# Patient Record
Sex: FEMALE | Race: BLACK OR AFRICAN AMERICAN | Employment: OTHER | URBAN - METROPOLITAN AREA
[De-identification: names, ages, dates, MRNs, and addresses within clinical notes are randomized per-mention and may not be internally consistent; named-entity substitution may affect disease eponyms.]

---

## 2017-04-18 ENCOUNTER — HOSPITAL ENCOUNTER (EMERGENCY)
Age: 67
Discharge: HOME | End: 2017-04-18
Attending: EMERGENCY MEDICINE
Payer: MEDICARE

## 2017-04-18 ENCOUNTER — APPOINTMENT (OUTPATIENT)
Dept: GENERAL RADIOLOGY | Age: 67
End: 2017-04-18
Attending: EMERGENCY MEDICINE
Payer: MEDICARE

## 2017-04-18 VITALS
BODY MASS INDEX: 24.8 KG/M2 | RESPIRATION RATE: 16 BRPM | HEART RATE: 82 BPM | HEIGHT: 63 IN | DIASTOLIC BLOOD PRESSURE: 75 MMHG | SYSTOLIC BLOOD PRESSURE: 144 MMHG | TEMPERATURE: 98.1 F | WEIGHT: 140 LBS | OXYGEN SATURATION: 99 %

## 2017-04-18 DIAGNOSIS — S39.012A BACK STRAIN, INITIAL ENCOUNTER: ICD-10-CM

## 2017-04-18 DIAGNOSIS — S50.11XA CONTUSION, ELBOW, WITH FOREARM, RIGHT, INITIAL ENCOUNTER: ICD-10-CM

## 2017-04-18 DIAGNOSIS — S80.01XA CONTUSION OF RIGHT KNEE, INITIAL ENCOUNTER: Primary | ICD-10-CM

## 2017-04-18 PROCEDURE — 73080 X-RAY EXAM OF ELBOW: CPT

## 2017-04-18 PROCEDURE — 74011250637 HC RX REV CODE- 250/637: Performed by: EMERGENCY MEDICINE

## 2017-04-18 PROCEDURE — 99283 EMERGENCY DEPT VISIT LOW MDM: CPT

## 2017-04-18 RX ORDER — ACETAMINOPHEN 325 MG/1
650 TABLET ORAL
Status: COMPLETED | OUTPATIENT
Start: 2017-04-18 | End: 2017-04-18

## 2017-04-18 RX ORDER — IBUPROFEN 600 MG/1
600 TABLET ORAL
Status: DISCONTINUED | OUTPATIENT
Start: 2017-04-18 | End: 2017-04-18 | Stop reason: HOSPADM

## 2017-04-18 RX ADMIN — ACETAMINOPHEN 650 MG: 325 TABLET ORAL at 14:56

## 2017-04-18 NOTE — ED TRIAGE NOTES
States that she tripped and fell in the kitchen x 30 minutes, c/o of right knee and right elbow pain with stiff lower back. Denies hitting head, no LOC, no blood thinner use.

## 2017-04-18 NOTE — ED NOTES
I have reviewed discharge instructions with the patient. The patient verbalized understanding.  Pt ambulated out of Ed in stable condition with no distress noted and no complaints voiced

## 2017-04-18 NOTE — ED NOTES
This nurse went to discharge pt, pt states she would like x-rays performed.  Dr Medrano Began made aware

## 2017-04-18 NOTE — DISCHARGE INSTRUCTIONS
Back Strain: Care Instructions  Your Care Instructions    Back strain happens when you overstretch, or pull, a muscle in your back. You may hurt your back in an accident or when you exercise or lift something. Most back pain will get better with rest and time. You can take care of yourself at home to help your back heal.  Follow-up care is a key part of your treatment and safety. Be sure to make and go to all appointments, and call your doctor if you are having problems. It's also a good idea to know your test results and keep a list of the medicines you take. How can you care for yourself at home? · Try to stay as active as you can, but stop or reduce any activity that causes pain. · Put ice or a cold pack on the sore muscle for 10 to 20 minutes at a time to stop swelling. Try this every 1 to 2 hours for 3 days (when you are awake) or until the swelling goes down. Put a thin cloth between the ice pack and your skin. · After 2 or 3 days, apply a heating pad on low or a warm cloth to your back. Some doctors suggest that you go back and forth between hot and cold treatments. · Take pain medicines exactly as directed. ¨ If the doctor gave you a prescription medicine for pain, take it as prescribed. ¨ If you are not taking a prescription pain medicine, ask your doctor if you can take an over-the-counter medicine. · Try sleeping on your side with a pillow between your legs. Or put a pillow under your knees when you lie on your back. These measures can ease pain in your lower back. · Return to your usual level of activity slowly. When should you call for help? Call 911 anytime you think you may need emergency care. For example, call if:  · You are unable to move a leg at all. Call your doctor now or seek immediate medical care if:  · You have new or worse symptoms in your legs, belly, or buttocks. Symptoms may include:  ¨ Numbness or tingling. ¨ Weakness. ¨ Pain.   · You lose bladder or bowel control. Watch closely for changes in your health, and be sure to contact your doctor if you are not getting better as expected. Where can you learn more? Go to http://kate-eliezer.info/. Enter A865 in the search box to learn more about \"Back Strain: Care Instructions. \"  Current as of: May 23, 2016  Content Version: 11.2  © 2823-3998 [employer]. Care instructions adapted under license by [employer] (which disclaims liability or warranty for this information). If you have questions about a medical condition or this instruction, always ask your healthcare professional. [place] [address] any warranty or liability for your use of this information. Bruises: Care Instructions  Your Care Instructions    Bruises occur when small blood vessels under the skin tear or rupture, most often from a twist, bump, or fall. Blood leaks into tissues under the skin and causes a black-and-blue spot that often turns colors, including purplish black, reddish blue, or yellowish green, as the bruise heals. Bruises hurt, but most are not serious and will go away on their own within 2 to 4 weeks. Sometimes, gravity causes them to spread down the body. A leg bruise usually will take longer to heal than a bruise on the face or arms. Follow-up care is a key part of your treatment and safety. Be sure to make and go to all appointments, and call your doctor if you are having problems. Its also a good idea to know your test results and keep a list of the medicines you take. How can you care for yourself at home? · Take pain medicines exactly as directed. ¨ If the doctor gave you a prescription medicine for pain, take it as prescribed. ¨ If you are not taking a prescription pain medicine, ask your doctor if you can take an over-the-counter medicine. · Put ice or a cold pack on the area for 10 to 20 minutes at a time. Put a thin cloth between the ice and your skin.   · If you can, prop up the bruised area on pillows as much as possible for the next few days. Try to keep the bruise above the level of your heart. When should you call for help? Call your doctor now or seek immediate medical care if:  · You have signs of infection, such as:  ¨ Increased pain, swelling, warmth, or redness. ¨ Red streaks leading from the bruise. ¨ Pus draining from the bruise. ¨ A fever. · You have a bruise on your leg and signs of a blood clot, such as:  ¨ Increasing redness and swelling along with warmth, tenderness, and pain in the bruised area. ¨ Pain in your calf, back of the knee, thigh, or groin. ¨ Redness and swelling in your leg or groin. · Your pain gets worse. Watch closely for changes in your health, and be sure to contact your doctor if:  · You do not get better as expected. Where can you learn more? Go to http://kate-eliezer.info/. Enter [phone] [phone] in the search box to learn more about \"Bruises: Care Instructions. \"  Current as of: May 27, 2016  Content Version: 11.2  © 6308-8031 [employer]. Care instructions adapted under license by [employer] (which disclaims liability or warranty for this information). If you have questions about a medical condition or this instruction, always ask your healthcare professional. [address] any warranty or liability for your use of this information.

## 2017-04-18 NOTE — ED PROVIDER NOTES
HPI Comments: 12:34 PM Felicia Orantes is a 77 y.o. female with a history of arthritis who presents to the emergency department complaining of right knee pain secondary to a fall that occurred in the hospital kitchen 30 minutes ago. Patient states she tripped over a cord and landed on all fours. Patient also c/o right elbow pain and mid back \"stiffness. \" Patient denies LOC or any other symptoms. There are no other concerns at this time. The history is provided by the patient. No past medical history on file. No past surgical history on file. No family history on file. Social History     Social History    Marital status:      Spouse name: N/A    Number of children: N/A    Years of education: N/A     Occupational History    Not on file. Social History Main Topics    Smoking status: Not on file    Smokeless tobacco: Not on file    Alcohol use Not on file    Drug use: Not on file    Sexual activity: Not on file     Other Topics Concern    Not on file     Social History Narrative         ALLERGIES: Review of patient's allergies indicates no known allergies. Review of Systems   Constitutional: Negative. Negative for appetite change and chills. HENT: Negative. Negative for congestion, sore throat and trouble swallowing. Eyes: Negative. Negative for visual disturbance. Respiratory: Negative. Negative for cough, shortness of breath and wheezing. Cardiovascular: Negative. Negative for chest pain, palpitations and leg swelling. Gastrointestinal: Negative. Negative for blood in stool and diarrhea. Genitourinary: Negative. Negative for difficulty urinating, dysuria, frequency and vaginal discharge. Musculoskeletal: Positive for arthralgias (right knee, right elbow) and back pain (lower back). Negative for myalgias. Skin: Negative. Negative for rash and wound. Neurological: Negative.   Negative for dizziness, syncope, speech difficulty, weakness and light-headedness. Psychiatric/Behavioral: Negative. Negative for hallucinations, self-injury and suicidal ideas. All other systems reviewed and are negative. Vitals:    04/18/17 1233   BP: 144/75   Pulse: 82   Resp: 16   Temp: 98.1 °F (36.7 °C)   SpO2: 99%   Weight: 63.5 kg (140 lb)   Height: 5' 3\" (1.6 m)            Physical Exam   Constitutional: She is oriented to person, place, and time. She appears well-developed and well-nourished. No distress. HENT:   Head: Normocephalic and atraumatic. Mouth/Throat: Oropharynx is clear and moist.   Eyes: Conjunctivae and EOM are normal. Pupils are equal, round, and reactive to light. No scleral icterus. Neck: Trachea normal and normal range of motion. Neck supple. No JVD present. No thyromegaly present. Cardiovascular: Normal rate, regular rhythm, S1 normal and S2 normal.    Pulmonary/Chest: Effort normal. No accessory muscle usage. No respiratory distress. Abdominal: Soft. Normal appearance. She exhibits no distension. There is no rigidity. Musculoskeletal: Normal range of motion. She exhibits no edema. Right knee with full flexion and extension; no instability and no effusion. Right elbow with full ROM to flexion and extension, pronation and supination; some tenderness over the olecranon bursa, but no effusion. No midline cervical, thoracic, or lumbar tenderness. Midline bilateral thoracic paraspinal tenderness. Neurological: She is alert and oriented to person, place, and time. She has normal strength. No cranial nerve deficit or sensory deficit. Coordination normal.   Skin: Skin is warm and intact. Bruising noted. No rash noted. Mild bruising over right anterior knee. Psychiatric: She has a normal mood and affect. Her speech is normal and behavior is normal.   Vitals reviewed.        MDM  Number of Diagnoses or Management Options  Back strain, initial encounter:   Contusion of right knee, initial encounter:   Contusion, elbow, with forearm, right, initial encounter:   Diagnosis management comments: Rodriguez Alonzo is a 77 y.o. Female coming in after a minor fall. No evidence of fracture or acute traumatic injury. Will advise on supportive treatment. ED Course       Procedures    Vitals:  Patient Vitals for the past 12 hrs:   Temp Pulse Resp BP SpO2   04/18/17 1233 98.1 °F (36.7 °C) 82 16 144/75 99 %   99% on RA, indicating adequate oxygenation. Progress notes, Consult notes or additional Procedure notes:     12:59 PM I have assessed the patient who will be discharged in stable condition. Patient is to return to emergency department if any new or worsening condition. Patient understands and verbalizes agreement with plan. Update: At discharge patient complains of more right elbow pain and requests Xray. No acute fracture seen. Will have follow up with PCP. Disposition:  Diagnosis:   1. Contusion of right knee, initial encounter    2. Contusion, elbow, with forearm, right, initial encounter    3. Back strain, initial encounter        Disposition: Discharged in stable condition. Follow-up Information     Follow up With Details Comments [address] [place] [address] Schedule an appointment as soon as possible for a visit As needed [address] [place] [facility] EMERGENCY DEPT Go to If symptoms worsen [address] [place] [id number]  [phone]           Patient's Medications    No medications on file     Scribe Attestation:    Mich Mckee, scribing for and in the presence of Joycelyn Thomas MD April 18, 2017 at 12:58 PM     Physician Attestation:   I personally performed the services described in this documentation, reviewed and edited the documentation which was dictated to the scribe in my presence, and it accurately records my words and actions.  Joycelyn Thomas MD  April 18, 2017 at 12:58 PM     Signed by: Pam Gomez, April 18, 2017,  12:58 PM

## 2021-07-22 ENCOUNTER — DOCUMENTATION ONLY (OUTPATIENT)
Dept: NEUROLOGY | Age: 71
End: 2021-07-22

## 2021-07-23 ENCOUNTER — OFFICE VISIT (OUTPATIENT)
Dept: NEUROLOGY | Age: 71
End: 2021-07-23
Payer: MEDICARE

## 2021-07-23 VITALS — HEIGHT: 64 IN | BODY MASS INDEX: 22.14 KG/M2

## 2021-07-23 DIAGNOSIS — R42 DIZZINESS: ICD-10-CM

## 2021-07-23 DIAGNOSIS — R90.82 WHITE MATTER DISEASE: Primary | ICD-10-CM

## 2021-07-23 PROCEDURE — 99204 OFFICE O/P NEW MOD 45 MIN: CPT | Performed by: STUDENT IN AN ORGANIZED HEALTH CARE EDUCATION/TRAINING PROGRAM

## 2021-07-23 PROCEDURE — 1090F PRES/ABSN URINE INCON ASSESS: CPT | Performed by: STUDENT IN AN ORGANIZED HEALTH CARE EDUCATION/TRAINING PROGRAM

## 2021-07-23 PROCEDURE — G8420 CALC BMI NORM PARAMETERS: HCPCS | Performed by: STUDENT IN AN ORGANIZED HEALTH CARE EDUCATION/TRAINING PROGRAM

## 2021-07-23 PROCEDURE — 1101F PT FALLS ASSESS-DOCD LE1/YR: CPT | Performed by: STUDENT IN AN ORGANIZED HEALTH CARE EDUCATION/TRAINING PROGRAM

## 2021-07-23 PROCEDURE — G8400 PT W/DXA NO RESULTS DOC: HCPCS | Performed by: STUDENT IN AN ORGANIZED HEALTH CARE EDUCATION/TRAINING PROGRAM

## 2021-07-23 PROCEDURE — G0463 HOSPITAL OUTPT CLINIC VISIT: HCPCS | Performed by: STUDENT IN AN ORGANIZED HEALTH CARE EDUCATION/TRAINING PROGRAM

## 2021-07-23 PROCEDURE — G8432 DEP SCR NOT DOC, RNG: HCPCS | Performed by: STUDENT IN AN ORGANIZED HEALTH CARE EDUCATION/TRAINING PROGRAM

## 2021-07-23 PROCEDURE — 3017F COLORECTAL CA SCREEN DOC REV: CPT | Performed by: STUDENT IN AN ORGANIZED HEALTH CARE EDUCATION/TRAINING PROGRAM

## 2021-07-23 PROCEDURE — G8427 DOCREV CUR MEDS BY ELIG CLIN: HCPCS | Performed by: STUDENT IN AN ORGANIZED HEALTH CARE EDUCATION/TRAINING PROGRAM

## 2021-07-23 PROCEDURE — G8536 NO DOC ELDER MAL SCRN: HCPCS | Performed by: STUDENT IN AN ORGANIZED HEALTH CARE EDUCATION/TRAINING PROGRAM

## 2021-07-23 RX ORDER — LANOLIN ALCOHOL/MO/W.PET/CERES
325 CREAM (GRAM) TOPICAL
COMMUNITY
Start: 2020-11-18 | End: 2021-11-18

## 2021-07-23 RX ORDER — CYCLOBENZAPRINE HCL 10 MG
10 TABLET ORAL
COMMUNITY
Start: 2020-09-16 | End: 2021-09-16

## 2021-07-23 RX ORDER — METOPROLOL SUCCINATE 25 MG/1
25 TABLET, EXTENDED RELEASE ORAL
COMMUNITY
Start: 2021-05-27 | End: 2022-05-27

## 2021-07-23 RX ORDER — CAPSAICIN 0.03 G/100G
CREAM TOPICAL
COMMUNITY
Start: 2021-05-14 | End: 2022-09-07

## 2021-07-23 RX ORDER — MECLIZINE HYDROCHLORIDE 25 MG/1
25 TABLET ORAL AS NEEDED
Qty: 20 TABLET | Refills: 3 | Status: SHIPPED | OUTPATIENT
Start: 2021-07-23 | End: 2021-08-12

## 2021-07-23 NOTE — PROGRESS NOTES
Sierra Perdue is a 79 y.o. female . presents for New Patient, Dizziness, and Multiple Sclerosis (r/o)   . A 79years old female patient with medical history of atrial fibrillation on Xarelto, arthritis, mixed connective tissue disease on immunosuppressive agents was referred here for evaluation of dizziness and MRI findings. She claims she has been having dizziness for the past 1 year. She feels lightheaded and a spinning sensation when sitting, when trying to get out of the bed, and when walking. She claims has not no relation with head movement. Fall lasted for few seconds. Not associated with nausea or vomiting. No weakness of her arms or legs. She has noticed reduced hearing on the left side but no associated earache, tinnitus or discharge. She feels off balance when she is walking. Sometimes she might go backwards try to avoid falling. Had 2 falls in the past the last one being in April. No associated passing out spells or episodes of loss of consciousness. She does not have any numbness. The dizziness is intermittent and has episodes 4-5 times per week. Patient does not have any tremor. No changes in her speech or swallowing. No diplopia. She had an MRI of the brain recently which showed nonspecific white matter changes with some microhemorrhages; no acute stroke or mass lesion. Review of Systems   Constitutional: Positive for weight loss. Negative for chills and fever. HENT: Positive for hearing loss and tinnitus. Eyes: Negative for blurred vision and double vision. Respiratory: Positive for shortness of breath (when walking). Negative for cough. Cardiovascular: Positive for chest pain. Negative for leg swelling. Gastrointestinal: Positive for nausea and vomiting. Genitourinary: Positive for dysuria (sometimes) and frequency. Negative for urgency. Musculoskeletal: Positive for back pain. Negative for neck pain. Skin: Positive for itching and rash. Neurological: Positive for dizziness and headaches. Negative for tingling, tremors, sensory change, speech change and focal weakness. Psychiatric/Behavioral: Negative for depression. The patient is not nervous/anxious.         Past Medical History:   Diagnosis Date    Arrhythmia     IRREGULAR HEART BEAT    Arthritis     Atrial fibrillation (HCC)     Diabetes (HCC)     Fibroids, submucosal     GERD (gastroesophageal reflux disease)     Hypertension     Lupus (systemic lupus erythematosus) (HCC)     AFFECTED LIVER    Regurgitation     FOOD COMES BACK UP ESOPHAGUS    Rheumatoid arthritis ([address]        Past Surgical History:   Procedure Laterality Date    HX  SECTION      X2    HX ENDOSCOPY      HX TOTAL VAGINAL HYSTERECTOMY Left     LEFT SALPINGO-OOPHORECTOMY        Family History   Problem Relation Age of Onset    Diabetes Mother     Hypertension Mother     Hypertension Father     Hypertension Sister    [facility] Lupus Sister     Dementia Brother     Diabetes Maternal Aunt     Diabetes Maternal Uncle     Diabetes Maternal Grandmother     Stroke Maternal Grandmother     Diabetes Maternal Grandfather     Hypertension Brother     Stroke Brother     Asthma Brother     Hypertension Brother     Hypertension Brother     Stroke Brother     Diabetes Brother     Diabetes Brother         Social History     Socioeconomic History    Marital status:      Spouse name: Not on file    Number of children: Not on file    Years of education: Not on file    Highest education level: Not on file   Occupational History    Not on file   Tobacco Use    Smoking status: Never Smoker    Smokeless tobacco: Never Used   Substance and Sexual Activity    Alcohol use: No    Drug use: No    Sexual activity: Yes     Partners: Male   Other Topics Concern    Not on file   Social History Narrative    Not on file     Social Determinants of Health     Financial Resource Strain:     Difficulty of Paying Living Expenses:    Food Insecurity:     Worried About [facility] in the Last Year:     [address] [facility] in the Last Year:    Transportation Needs:     Lack of Transportation (Medical):  Lack of Transportation (Non-Medical):    Physical Activity:     Days of Exercise per Week:     Minutes of Exercise per Session:    Stress:     Feeling of Stress :    Social Connections:     Frequency of Communication with Friends and Family:     Frequency of Social Gatherings with Friends and Family:     Attends [employer] Services:     Active Member of Clubs or Organizations:     Attends Club or Organization Meetings:     Marital Status:    Intimate Partner Violence:     Fear of Current or Ex-Partner:     Emotionally Abused:     Physically Abused:     Sexually Abused: Allergies   Allergen Reactions    Celebrex [Celecoxib] Hives         Current Outpatient Medications   Medication Sig Dispense Refill    metoprolol succinate (TOPROL-XL) 25 mg XL tablet Take 25 mg by mouth.  capsicum oleoresin 0.025 % topical cream       cyclobenzaprine (FLEXERIL) 10 mg tablet Take 10 mg by mouth.  ferrous sulfate (FerrouSuL) 325 mg (65 mg iron) tablet Take 325 mg by mouth every Monday, Wednesday, Friday.  diclofenac (VOLTAREN) 1 % gel Apply  to affected area four (4) times daily. Indications: joint damage causing pain and loss of function      hydroxychloroquine (PLAQUENIL) 200 mg tablet Take 200 mg by mouth daily. Indications: systemic lupus erythematosus, an autoimmune disease      omeprazole (PRILOSEC) 40 mg capsule Take 40 mg by mouth two (2) times a day. Indications: gastroesophageal reflux disease      NIFEdipine ER (ADALAT CC) 30 mg ER tablet Take 30 mg by mouth daily. Indications: high blood pressure      leflunomide (ARAVA) 10 mg tablet Take 10 mg by mouth daily. Indications: rheumatoid arthritis      rivaroxaban (XARELTO) 20 mg tab tablet Take 20 mg by mouth daily.  Indications: treatment to prevent blood clots in chronic atrial fibrillation      metFORMIN (GLUCOPHAGE) 1,000 mg tablet Take 1,000 mg by mouth daily. (Patient not taking: Reported on 7/23/2021)      flecainide (TAMBOCOR) 100 mg tablet Take 100 mg by mouth two (2) times a day. Indications: PAROXYSMAL ATRIAL FIBRILLATION, PREVENTION OF RECURRENT ATRIAL FIBRILLATION (Patient not taking: Reported on 7/23/2021)           Physical Exam  Constitutional:       Appearance: Normal appearance. HENT:      Head: Normocephalic and atraumatic. Mouth/Throat:      Mouth: Mucous membranes are moist.      Pharynx: Oropharynx is clear. No oropharyngeal exudate. Eyes:      Extraocular Movements: Extraocular movements intact. Pupils: Pupils are equal, round, and reactive to light. Pulmonary:      Effort: Pulmonary effort is normal. No respiratory distress. Musculoskeletal:         General: No tenderness. Cervical back: Normal range of motion and neck supple. Right lower leg: No edema. Left lower leg: No edema. Comments: Thickening of the fingers on both sides     Neurological:      Mental Status: She is alert. Comments: Mental status: Awake, alert, oriented x3, follows simple and complex commands, no neglect, no extinction to DSS or VSS  Speech and languge: fluent, coherent, and comprehension intact  CN: VFF, EOMI with no nystagmus, PERRLA, face sensation intact , no facial asymmetry noted, palate elevation symmetric bilat, SS+SCM 5/5 bilat, tongue midline. Negative head thrust test  Motor: no pronator drift, tone normal throughout, strength 5/5 throughout  Sensory: intact to light touch and PP  throughout  Coordination: FNF, HS accurate w/o dysmetria  DTR: 2+ throughou  Gait: normal.             No visits with results within 3 Month(s) from this visit.    Latest known visit with results is:   Hospital Outpatient Visit on 03/14/2011   Component Date Value Ref Range Status    Glucose (POC) 03/14/2011 134* 70 - 110 mg/dL Final             ICD-10-CM ICD-9-CM    1. White matter disease  R90.82 348.89 STRATIFY JCV AB WITH INDEX W/RELEX INHIBITION ASSAY   2. Dizziness  R42 780.4 meclizine (ANTIVERT) 25 mg tablet     A 79years old female patient with upper medical problems including mixed connective tissue disease currently taking leflunomide and hydroxychloroquine with referred here for evaluation of dizziness and intermittent balance difficulty of about a year duration. No focal neuro deficits on exam.  Patient has decreased hearing on the left side. Symptoms are intermittent and lasting for a brief. .  No association with head movement. MRI of the brain showed nonspecific white matter changes with microhemorrhages which could be from longstanding hypertension. But in this patient, on immunosuppressive agents, will check CEASAR virus is a serum. If the CEASAR virus serum antibodies. If the test is positive, might consider lumbar puncture for CSF studies. Unlikely to be from the evaluating process like multiple sclerosis in her age group. Possibilities of BPPV versus Ménière's disease [reduced hearing on the left side]. We will give her meclizine as needed for symptomatic treatment. We will see her in the clinic in 3 months time.

## 2021-10-12 ENCOUNTER — OFFICE VISIT (OUTPATIENT)
Dept: NEUROLOGY | Age: 71
End: 2021-10-12
Payer: MEDICARE

## 2021-10-12 VITALS
HEIGHT: 63 IN | BODY MASS INDEX: 19.81 KG/M2 | RESPIRATION RATE: 20 BRPM | OXYGEN SATURATION: 99 % | SYSTOLIC BLOOD PRESSURE: 132 MMHG | DIASTOLIC BLOOD PRESSURE: 80 MMHG | WEIGHT: 111.8 LBS | HEART RATE: 67 BPM

## 2021-10-12 DIAGNOSIS — R42 DIZZINESS: Primary | ICD-10-CM

## 2021-10-12 PROCEDURE — 3017F COLORECTAL CA SCREEN DOC REV: CPT | Performed by: STUDENT IN AN ORGANIZED HEALTH CARE EDUCATION/TRAINING PROGRAM

## 2021-10-12 PROCEDURE — G0463 HOSPITAL OUTPT CLINIC VISIT: HCPCS | Performed by: STUDENT IN AN ORGANIZED HEALTH CARE EDUCATION/TRAINING PROGRAM

## 2021-10-12 PROCEDURE — 1090F PRES/ABSN URINE INCON ASSESS: CPT | Performed by: STUDENT IN AN ORGANIZED HEALTH CARE EDUCATION/TRAINING PROGRAM

## 2021-10-12 PROCEDURE — G8536 NO DOC ELDER MAL SCRN: HCPCS | Performed by: STUDENT IN AN ORGANIZED HEALTH CARE EDUCATION/TRAINING PROGRAM

## 2021-10-12 PROCEDURE — G8400 PT W/DXA NO RESULTS DOC: HCPCS | Performed by: STUDENT IN AN ORGANIZED HEALTH CARE EDUCATION/TRAINING PROGRAM

## 2021-10-12 PROCEDURE — 99213 OFFICE O/P EST LOW 20 MIN: CPT | Performed by: STUDENT IN AN ORGANIZED HEALTH CARE EDUCATION/TRAINING PROGRAM

## 2021-10-12 PROCEDURE — 1101F PT FALLS ASSESS-DOCD LE1/YR: CPT | Performed by: STUDENT IN AN ORGANIZED HEALTH CARE EDUCATION/TRAINING PROGRAM

## 2021-10-12 PROCEDURE — G8432 DEP SCR NOT DOC, RNG: HCPCS | Performed by: STUDENT IN AN ORGANIZED HEALTH CARE EDUCATION/TRAINING PROGRAM

## 2021-10-12 PROCEDURE — G8420 CALC BMI NORM PARAMETERS: HCPCS | Performed by: STUDENT IN AN ORGANIZED HEALTH CARE EDUCATION/TRAINING PROGRAM

## 2021-10-12 PROCEDURE — G8427 DOCREV CUR MEDS BY ELIG CLIN: HCPCS | Performed by: STUDENT IN AN ORGANIZED HEALTH CARE EDUCATION/TRAINING PROGRAM

## 2021-10-12 RX ORDER — MECLIZINE HYDROCHLORIDE 25 MG/1
25 TABLET ORAL
Qty: 20 TABLET | Refills: 2 | Status: SHIPPED | OUTPATIENT
Start: 2021-10-12 | End: 2021-11-01

## 2021-10-12 NOTE — PROGRESS NOTES
Keyanna Monroe is a 70 y.o. female . presents for Follow-up (white matter disease)   . A 70years old female patient here for follow-up of dizziness. Last seen in the clinic in June 2021. She still complains of the intermittent spinning sensation. Is mostly when she is trying to get up; but, when moving her head from side to side, do not have any significant spinning. Not associated with nausea or vomiting. When walking, she feels off balance. No recent falls. No weakness of her arms or legs. Currently has only mild headache. Because of her MRI changes, CEASAR virus test was ordered but not done. Not currently taking meclizine. From previous encounter:  A 79years old female patient with medical history of atrial fibrillation on Xarelto, arthritis, mixed connective tissue disease on immunosuppressive agents was referred here for evaluation of dizziness and MRI findings. She claims she has been having dizziness for the past 1 year. She feels lightheaded and a spinning sensation when sitting, when trying to get out of the bed, and when walking. She claims has not no relation with head movement. Fall lasted for few seconds. Not associated with nausea or vomiting. No weakness of her arms or legs. She has noticed reduced hearing on the left side but no associated earache, tinnitus or discharge. She feels off. When she is walking. Sometimes she might go backwards try to avoid falling. Had 2 falls in the past the last one being in April. No associated passing out spells or episodes of loss of consciousness. She does not have any numbness. The dizziness is intermittent and has episodes 4-5 times per week. Patient does not have any tremor. No changes in her speech or swallowing. No diplopia. She had an MRI of the brain recently which showed nonspecific white matter changes with some microhemorrhages; no acute stroke or mass lesion.             Review of Systems   Constitutional: Negative for chills, fever and weight loss. HENT: Positive for hearing loss and tinnitus. Eyes: Negative for blurred vision and double vision. Respiratory: Positive for shortness of breath (when walking). Negative for cough. Cardiovascular: Negative for chest pain and leg swelling. Gastrointestinal: Negative for nausea and vomiting. Genitourinary: Negative for dysuria, frequency and urgency. Musculoskeletal: Negative for back pain and neck pain. Skin: Negative for itching and rash. Neurological: Positive for dizziness and headaches. Negative for tingling, tremors, sensory change, speech change and focal weakness.        Past Medical History:   Diagnosis Date    Arrhythmia     IRREGULAR HEART BEAT    Arthritis     Atrial fibrillation ([facility]     Diabetes ([facility]     Fibroids, submucosal     GERD (gastroesophageal reflux disease)     Hypertension     Lupus (systemic lupus erythematosus) (HCC)     AFFECTED LIVER    Regurgitation     FOOD COMES BACK UP ESOPHAGUS    Rheumatoid arthritis ([facility] [address]        Past Surgical History:   Procedure Laterality Date    HX  SECTION      X2    HX ENDOSCOPY      HX TOTAL VAGINAL HYSTERECTOMY Left     LEFT SALPINGO-OOPHORECTOMY        Family History   Problem Relation Age of Onset    Diabetes Mother     Hypertension Mother     Hypertension Father     Hypertension Sister    Jannell Outhouse Lupus Sister     Dementia Brother     Diabetes Maternal Aunt     Diabetes Maternal Uncle     Diabetes Maternal Grandmother     Stroke Maternal Grandmother     Diabetes Maternal Grandfather     Hypertension Brother     Stroke Brother     Asthma Brother     Hypertension Brother     Hypertension Brother     Stroke Brother     Diabetes Brother     Diabetes Brother         Social History     Socioeconomic History    Marital status:      Spouse name: Not on file    Number of children: Not on file    Years of education: Not on file    Highest education level: Not on file Occupational History    Not on file   Tobacco Use    Smoking status: Never Smoker    Smokeless tobacco: Never Used   Substance and Sexual Activity    Alcohol use: No    Drug use: No    Sexual activity: Yes     Partners: Male   Other Topics Concern    Not on file   Social History Narrative    Not on file     Social Determinants of Health     Financial Resource Strain:     Difficulty of Paying Living Expenses:    Food Insecurity:     Worried About Running Out of Food in the Last Year:     [address] in the Last Year:    Transportation Needs:     Lack of Transportation (Medical):  Lack of Transportation (Non-Medical):    Physical Activity:     Days of Exercise per Week:     Minutes of Exercise per Session:    Stress:     Feeling of Stress :    Social Connections:     Frequency of Communication with Friends and Family:     Frequency of Social Gatherings with Friends and Family:     Attends Jewish Services:     Active Member of Clubs or Organizations:     Attends Club or Organization Meetings:     Marital Status:    Intimate Partner Violence:     Fear of Current or Ex-Partner:     Emotionally Abused:     Physically Abused:     Sexually Abused: Allergies   Allergen Reactions    Celecoxib Hives     Reaction(s): Unknown; Note: [id number] [facility] [phone] HIVES. takes vioxx at home           Current Outpatient Medications   Medication Sig Dispense Refill    meclizine (ANTIVERT) 25 mg tablet Take 1 Tablet by mouth every twelve (12) hours as needed for Dizziness for up to 20 days. 20 Tablet 2    metoprolol succinate (TOPROL-XL) 25 mg XL tablet Take 25 mg by mouth.  capsicum oleoresin 0.025 % topical cream       ferrous sulfate (FerrouSuL) 325 mg (65 mg iron) tablet Take 325 mg by mouth every Monday, Wednesday, Friday.  diclofenac (VOLTAREN) 1 % gel Apply  to affected area four (4) times daily.  Indications: joint damage causing pain and loss of function      hydroxychloroquine (PLAQUENIL) 200 mg tablet Take 200 mg by mouth daily. Indications: systemic lupus erythematosus, an autoimmune disease      flecainide (TAMBOCOR) 100 mg tablet Take 100 mg by mouth two (2) times a day. Indications: paroxysmal atrial fibrillation, prevention of recurrent atrial fibrillation      omeprazole (PRILOSEC) 40 mg capsule Take 40 mg by mouth two (2) times a day. Indications: gastroesophageal reflux disease      NIFEdipine ER (ADALAT CC) 30 mg ER tablet Take 30 mg by mouth daily. Indications: high blood pressure      leflunomide (ARAVA) 10 mg tablet Take 10 mg by mouth daily. Indications: rheumatoid arthritis      rivaroxaban (XARELTO) 20 mg tab tablet Take 20 mg by mouth daily. Indications: treatment to prevent blood clots in chronic atrial fibrillation           Physical Exam  Constitutional:       Appearance: Normal appearance. HENT:      Head: Normocephalic and atraumatic. Mouth/Throat:      Mouth: Mucous membranes are moist.      Pharynx: Oropharynx is clear. No oropharyngeal exudate. Eyes:      Extraocular Movements: Extraocular movements intact. Pupils: Pupils are equal, round, and reactive to light. Pulmonary:      Effort: Pulmonary effort is normal. No respiratory distress. Musculoskeletal:         General: No tenderness. Cervical back: Normal range of motion and neck supple. Right lower leg: No edema. Left lower leg: No edema. Comments: Thickening of the fingers on both sides     Neurological:      Mental Status: She is alert. Comments: Mental status: Awake, alert, oriented x3, follows simple and complex commands, no neglect, no extinction to DSS or VSS  Speech and languge: fluent, coherent, and comprehension intact  CN: VFF, EOMI with no nystagmus, PERRLA, face sensation intact , no facial asymmetry noted, palate elevation symmetric bilat, SS+SCM 5/5 bilat, tongue midline.     Motor: no pronator drift, tone normal throughout, strength 5/5 throughout  Sensory: intact to light touch and PP  throughout  Coordination: FNF, HS accurate w/o dysmetria  DTR: 2+ throughou  Gait: normal.             No visits with results within 3 Month(s) from this visit. Latest known visit with results is:   Hospital Outpatient Visit on 03/14/2011   Component Date Value Ref Range Status    Glucose (POC) 03/14/2011 134* 70 - 110 mg/dL Final             ICD-10-CM ICD-9-CM    1. Dizziness  R42 780.4 meclizine (ANTIVERT) 25 mg tablet     A 70years old female patient with upper medical problems including mixed connective tissue disease currently taking leflunomide and hydroxychloroquine for follow-up of dizziness. Continues to have intermittent spinning sensation especially when she is trying to get up. Not associated nausea or vomiting. Feels a little off balance but no falls. We will continue with as needed meclizine. No significant orthostatic changes in her blood pressure. Discussed the need to control her blood pressure. Since she is on leflunomide, will get CEASAR virus antibodies. I have advised her to go to the emergency room if any strokelike symptoms. We will see her again as needed in 6 months time.

## 2022-11-07 ENCOUNTER — HOSPITAL ENCOUNTER (OUTPATIENT)
Dept: LAB | Age: 72
Discharge: HOME | End: 2022-11-07
Payer: MEDICARE

## 2022-11-07 DIAGNOSIS — C67.9 MALIGNANT NEOPLASM OF URINARY BLADDER, UNSPECIFIED SITE (HCC): ICD-10-CM

## 2022-11-07 DIAGNOSIS — C80.1 ADENOCARCINOMA (HCC): ICD-10-CM

## 2022-11-07 DIAGNOSIS — C68.0 URETHRA CANCER (HCC): ICD-10-CM

## 2022-11-07 DIAGNOSIS — C68.0 MALIGNANT NEOPLASM OF URETHRA (HCC): ICD-10-CM

## 2022-11-07 DIAGNOSIS — R79.89 OTHER SPECIFIED ABNORMAL FINDINGS OF BLOOD CHEMISTRY: ICD-10-CM

## 2022-11-07 LAB
ALBUMIN SERPL-MCNC: 3.4 G/DL (ref 3.4–5)
ALBUMIN/GLOB SERPL: 0.8 {RATIO} (ref 0.8–1.7)
ALP SERPL-CCNC: 102 U/L (ref 45–117)
ALT SERPL-CCNC: 21 U/L (ref 13–56)
ANION GAP SERPL CALC-SCNC: 4 MMOL/L (ref 3–18)
AST SERPL-CCNC: 23 U/L (ref 10–38)
BASOPHILS # BLD: 0 K/UL (ref 0–0.1)
BASOPHILS NFR BLD: 1 % (ref 0–2)
BILIRUB SERPL-MCNC: 0.3 MG/DL (ref 0.2–1)
BUN SERPL-MCNC: 14 MG/DL (ref 7–18)
BUN/CREAT SERPL: 21 (ref 12–20)
CALCIUM SERPL-MCNC: 9.1 MG/DL (ref 8.5–10.1)
CEA SERPL-MCNC: 31.7 NG/ML
CHLORIDE SERPL-SCNC: 110 MMOL/L (ref 100–111)
CO2 SERPL-SCNC: 28 MMOL/L (ref 21–32)
CREAT SERPL-MCNC: 0.68 MG/DL (ref 0.6–1.3)
DIFFERENTIAL METHOD BLD: ABNORMAL
EOSINOPHIL # BLD: 0 K/UL (ref 0–0.4)
EOSINOPHIL NFR BLD: 1 % (ref 0–5)
ERYTHROCYTE [DISTWIDTH] IN BLOOD BY AUTOMATED COUNT: 19.1 % (ref 11.6–14.5)
GLOBULIN SER CALC-MCNC: 4.5 G/DL (ref 2–4)
GLUCOSE SERPL-MCNC: 108 MG/DL (ref 74–99)
HCT VFR BLD AUTO: 35.9 % (ref 35–45)
HGB BLD-MCNC: 10.7 G/DL (ref 12–16)
IMM GRANULOCYTES # BLD AUTO: 0 K/UL (ref 0–0.04)
IMM GRANULOCYTES NFR BLD AUTO: 0 % (ref 0–0.5)
LYMPHOCYTES # BLD: 1.3 K/UL (ref 0.9–3.6)
LYMPHOCYTES NFR BLD: 37 % (ref 21–52)
MCH RBC QN AUTO: 19 PG (ref 24–34)
MCHC RBC AUTO-ENTMCNC: 29.8 G/DL (ref 31–37)
MCV RBC AUTO: 63.9 FL (ref 78–100)
MONOCYTES # BLD: 0.3 K/UL (ref 0.05–1.2)
MONOCYTES NFR BLD: 9 % (ref 3–10)
NEUTS SEG # BLD: 1.9 K/UL (ref 1.8–8)
NEUTS SEG NFR BLD: 52 % (ref 40–73)
NRBC # BLD: 0 K/UL (ref 0–0.01)
NRBC BLD-RTO: 0 PER 100 WBC
PLATELET # BLD AUTO: 265 K/UL (ref 135–420)
PLATELET COMMENTS,PCOM: ABNORMAL
POTASSIUM SERPL-SCNC: 4 MMOL/L (ref 3.5–5.5)
PROT SERPL-MCNC: 7.9 G/DL (ref 6.4–8.2)
PSA SERPL-MCNC: 0 NG/ML (ref 0–4)
RBC # BLD AUTO: 5.62 M/UL (ref 4.2–5.3)
RBC MORPH BLD: ABNORMAL
SODIUM SERPL-SCNC: 142 MMOL/L (ref 136–145)
VIT B12 SERPL-MCNC: 245 PG/ML (ref 211–911)
WBC # BLD AUTO: 3.5 K/UL (ref 4.6–13.2)

## 2022-11-07 PROCEDURE — 85025 COMPLETE CBC W/AUTO DIFF WBC: CPT

## 2022-11-07 PROCEDURE — 80053 COMPREHEN METABOLIC PANEL: CPT

## 2022-11-07 PROCEDURE — 84153 ASSAY OF PSA TOTAL: CPT

## 2022-11-07 PROCEDURE — 82607 VITAMIN B-12: CPT

## 2022-11-07 PROCEDURE — 86301 IMMUNOASSAY TUMOR CA 19-9: CPT

## 2022-11-07 PROCEDURE — 82378 CARCINOEMBRYONIC ANTIGEN: CPT

## 2022-11-07 PROCEDURE — 36415 COLL VENOUS BLD VENIPUNCTURE: CPT

## 2022-11-08 LAB — CANCER AG19-9 SERPL-ACNC: 15 U/ML (ref 0–35)

## 2023-08-16 PROBLEM — E78.5 HYPERLIPIDEMIA, UNSPECIFIED: Status: ACTIVE | Noted: 2023-06-28

## 2023-08-16 PROBLEM — R41.82 ALTERED MENTAL STATUS: Status: ACTIVE | Noted: 2023-06-19

## 2023-08-16 PROBLEM — S42.209A FRACTURE OF PROXIMAL END OF HUMERUS: Status: ACTIVE | Noted: 2019-02-06

## 2023-08-16 PROBLEM — I12.9 HYPERTENSIVE RENAL DISEASE: Status: ACTIVE | Noted: 2023-08-16

## 2023-08-16 PROBLEM — N18.1 STAGE 1 CHRONIC KIDNEY DISEASE: Status: ACTIVE | Noted: 2023-08-16

## 2023-08-16 PROBLEM — R00.0 TACHYCARDIA, UNSPECIFIED: Status: ACTIVE | Noted: 2023-08-16

## 2023-08-16 PROBLEM — N18.2 CHRONIC KIDNEY DISEASE, STAGE 2 (MILD): Status: ACTIVE | Noted: 2023-06-28

## 2023-08-16 PROBLEM — M51.36 DEGENERATION OF INTERVERTEBRAL DISC OF LUMBAR REGION: Status: ACTIVE | Noted: 2023-08-16

## 2023-08-16 PROBLEM — H93.19 TINNITUS: Status: ACTIVE | Noted: 2023-08-16

## 2023-08-16 PROBLEM — M62.81 MUSCLE WEAKNESS (GENERALIZED): Status: ACTIVE | Noted: 2023-06-28

## 2023-08-16 PROBLEM — M21.629 TAILOR'S BUNION: Status: ACTIVE | Noted: 2023-08-16

## 2023-08-16 PROBLEM — R55 SYNCOPE AND COLLAPSE: Status: ACTIVE | Noted: 2023-06-28

## 2023-08-16 PROBLEM — N17.9 ACUTE RENAL FAILURE (HCC): Status: ACTIVE | Noted: 2023-06-19

## 2023-08-16 PROBLEM — M81.0 AGE-RELATED OSTEOPOROSIS WITHOUT CURRENT PATHOLOGICAL FRACTURE: Status: ACTIVE | Noted: 2023-06-28
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
## 2023-08-16 PROBLEM — R79.9 ABNORMAL BLOOD CHEMISTRY: Status: ACTIVE | Noted: 2023-08-16

## 2023-08-16 PROBLEM — E87.21 ACUTE METABOLIC ACIDOSIS: Status: ACTIVE | Noted: 2023-06-19

## 2023-08-16 PROBLEM — D64.9 ANEMIA, UNSPECIFIED: Status: ACTIVE | Noted: 2023-06-28

## 2023-08-16 PROBLEM — H26.009 PRESENILE CATARACT: Status: ACTIVE | Noted: 2023-08-16

## 2023-08-16 PROBLEM — M79.0 RHEUMATISM, UNSPECIFIED: Status: ACTIVE | Noted: 2023-08-16

## 2023-08-16 PROBLEM — M51.369 DEGENERATION OF INTERVERTEBRAL DISC OF LUMBAR REGION: Status: ACTIVE | Noted: 2023-08-16

## 2023-08-16 PROBLEM — R80.9 PROTEINURIA: Status: ACTIVE | Noted: 2023-08-16

## 2023-08-16 PROBLEM — R06.02 SHORTNESS OF BREATH: Status: ACTIVE | Noted: 2017-11-14

## 2023-08-16 PROBLEM — I10 HTN (HYPERTENSION): Status: ACTIVE | Noted: 2023-04-01

## 2023-08-16 PROBLEM — A41.9 SEPSIS, UNSPECIFIED ORGANISM (HCC): Status: ACTIVE | Noted: 2023-06-28

## 2023-08-16 PROBLEM — N39.0 URINARY TRACT INFECTION: Status: ACTIVE | Noted: 2023-08-16

## 2023-08-16 PROBLEM — R42 DIZZINESS AND GIDDINESS: Status: ACTIVE | Noted: 2023-08-16

## 2023-08-16 PROBLEM — D63.8 ANEMIA IN OTHER CHRONIC DISEASES CLASSIFIED ELSEWHERE: Status: ACTIVE | Noted: 2023-08-16

## 2023-08-16 PROBLEM — H04.129 TEAR FILM INSUFFICIENCY: Status: ACTIVE | Noted: 2023-08-16
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
## 2023-08-16 PROBLEM — M77.9 BONE SPUR: Status: ACTIVE | Noted: 2023-08-16

## 2023-08-16 PROBLEM — D64.9 ANEMIA: Status: ACTIVE | Noted: 2023-08-16

## 2023-08-16 PROBLEM — I48.0 PAROXYSMAL ATRIAL FIBRILLATION (HCC): Status: ACTIVE | Noted: 2023-04-01

## 2023-08-16 PROBLEM — I21.A1 MYOCARDIAL INFARCTION TYPE 2 (HCC): Status: ACTIVE | Noted: 2023-08-16

## 2023-08-16 PROBLEM — M54.50 LOWER BACK PAIN: Status: ACTIVE | Noted: 2023-08-16

## 2023-08-16 PROBLEM — T68.XXXA HYPOTHERMIA: Status: ACTIVE | Noted: 2023-06-19

## 2023-08-16 PROBLEM — M34.9 SYSTEMIC SCLEROSIS, UNSPECIFIED (HCC): Status: ACTIVE | Noted: 2023-06-28

## 2023-08-16 PROBLEM — R91.1 SOLITARY PULMONARY NODULE: Status: ACTIVE | Noted: 2018-09-20

## 2023-08-16 PROBLEM — A49.8 INFECTION DUE TO ESCHERICHIA COLI: Status: ACTIVE | Noted: 2023-06-19

## 2023-08-16 PROBLEM — C67.9 MALIGNANT NEOPLASM OF URINARY BLADDER (HCC): Status: ACTIVE | Noted: 2023-04-01

## 2023-08-16 PROBLEM — G47.39 OTHER SLEEP APNEA: Status: ACTIVE | Noted: 2023-06-28

## 2023-08-16 PROBLEM — E63.9 DEFICIENCY OF MACRONUTRIENTS: Status: ACTIVE | Noted: 2023-04-01

## 2023-08-16 PROBLEM — M35.9 POLYMYOSITIS ASSOCIATED WITH AUTOIMMUNE DISEASE (HCC): Status: ACTIVE | Noted: 2023-08-16

## 2023-08-16 PROBLEM — M81.0 OSTEOPOROSIS: Status: ACTIVE | Noted: 2023-08-16

## 2023-08-16 PROBLEM — R63.4 ABNORMAL WEIGHT LOSS: Status: ACTIVE | Noted: 2023-08-16

## 2023-08-16 PROBLEM — R13.10 DYSPHAGIA: Status: ACTIVE | Noted: 2023-06-28

## 2023-08-16 PROBLEM — E11.22 TYPE 2 DIABETES MELLITUS WITH CHRONIC KIDNEY DISEASE (HCC): Status: ACTIVE | Noted: 2023-06-19

## 2023-08-16 PROBLEM — C68.0 URETHRAL CARCINOMA (HCC): Status: ACTIVE | Noted: 2022-11-07

## 2023-08-16 PROBLEM — G47.01 INSOMNIA DUE TO MEDICAL CONDITION: Status: ACTIVE | Noted: 2023-08-16

## 2023-08-16 PROBLEM — E66.9 OBESITY: Status: ACTIVE | Noted: 2023-08-16

## 2023-08-16 PROBLEM — R26.2 DIFFICULTY IN WALKING, NOT ELSEWHERE CLASSIFIED: Status: ACTIVE | Noted: 2023-06-28

## 2023-08-16 PROBLEM — I31.39 PERICARDIAL EFFUSION: Status: ACTIVE | Noted: 2023-08-16

## 2023-08-16 PROBLEM — I07.1 RHEUMATIC TRICUSPID INSUFFICIENCY: Status: ACTIVE | Noted: 2023-08-16

## 2023-08-16 PROBLEM — I27.20 PULMONARY HYPERTENSION, UNSPECIFIED (HCC): Status: ACTIVE | Noted: 2023-08-16

## 2023-08-16 PROBLEM — I34.0 NONRHEUMATIC MITRAL (VALVE) INSUFFICIENCY: Status: ACTIVE | Noted: 2023-08-16

## 2023-08-16 PROBLEM — R62.7 ADULT FAILURE TO THRIVE: Status: ACTIVE | Noted: 2023-08-16

## 2023-08-16 PROBLEM — M77.40 METATARSALGIA: Status: ACTIVE | Noted: 2023-08-16

## 2023-08-16 PROBLEM — I83.93 VARICOSE VEINS OF BOTH LOWER EXTREMITIES: Status: ACTIVE | Noted: 2023-08-16

## 2023-08-16 PROBLEM — M32.9 SYSTEMIC LUPUS ERYTHEMATOSUS (HCC): Status: ACTIVE | Noted: 2023-06-28

## 2023-08-16 PROBLEM — M33.20 POLYMYOSITIS ASSOCIATED WITH AUTOIMMUNE DISEASE (HCC): Status: ACTIVE | Noted: 2023-08-16

## 2023-08-16 PROBLEM — I12.9 HYPERTENSIVE CHRONIC KIDNEY DISEASE WITH STAGE 1 THROUGH STAGE 4 CHRONIC KIDNEY DISEASE, OR UNSPECIFIED CHRONIC KIDNEY DISEASE: Status: ACTIVE | Noted: 2023-08-16

## 2023-08-16 PROBLEM — Z93.1 GASTROSTOMY STATUS (HCC): Status: ACTIVE | Noted: 2023-06-28

## 2023-08-16 PROBLEM — J47.9 BRONCHIECTASIS, UNCOMPLICATED (HCC): Status: ACTIVE | Noted: 2019-06-04

## 2023-08-16 PROBLEM — H91.90 HEARING LOSS: Status: ACTIVE | Noted: 2023-08-16

## 2023-08-16 PROBLEM — J84.9 INTERSTITIAL PULMONARY DISEASE, UNSPECIFIED (HCC): Status: ACTIVE | Noted: 2019-03-12

## 2023-08-16 PROBLEM — K11.20 PAROTITIS: Status: ACTIVE | Noted: 2023-08-16

## 2023-08-16 PROBLEM — E55.9 VITAMIN D DEFICIENCY: Status: ACTIVE | Noted: 2023-08-16

## 2023-08-16 PROBLEM — G56.00 CARPAL TUNNEL SYNDROME: Status: ACTIVE | Noted: 2023-08-16

## 2023-08-16 PROBLEM — K21.9 GASTRO-ESOPHAGEAL REFLUX DISEASE WITHOUT ESOPHAGITIS: Status: ACTIVE | Noted: 2023-04-01
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
## 2023-09-15 PROBLEM — N39.0 URINARY TRACT INFECTION: Status: RESOLVED | Noted: 2023-08-16 | Resolved: 2023-09-15

## 2024-01-22 PROBLEM — E11.9 DIABETES MELLITUS, TYPE 2 (HCC): Status: ACTIVE | Noted: 2023-06-19

## 2024-01-22 PROBLEM — M32.14 SLE GLOMERULONEPHRITIS SYNDROME (HCC): Status: ACTIVE | Noted: 2024-01-16

## 2024-01-22 PROBLEM — D50.9 IRON DEFICIENCY ANEMIA: Status: ACTIVE | Noted: 2024-01-22

## 2024-01-22 PROBLEM — M25.70 OSTEOPHYTE: Status: ACTIVE | Noted: 2023-08-16

## 2024-01-22 PROBLEM — G47.00 INSOMNIA: Status: ACTIVE | Noted: 2023-08-16

## 2024-01-22 PROBLEM — T45.1X5S PERIPHERAL NEUROPATHY DUE TO AND NOT CONCURRENT WITH CHEMOTHERAPY (HCC): Status: ACTIVE | Noted: 2024-01-22

## 2024-01-22 PROBLEM — N18.9 CHRONIC KIDNEY DISEASE: Status: ACTIVE | Noted: 2023-06-28

## 2024-01-22 PROBLEM — G62.0 PERIPHERAL NEUROPATHY DUE TO AND NOT CONCURRENT WITH CHEMOTHERAPY (HCC): Status: ACTIVE | Noted: 2024-01-22

## 2024-01-22 PROBLEM — E43 UNSPECIFIED SEVERE PROTEIN-CALORIE MALNUTRITION (HCC): Status: ACTIVE | Noted: 2023-04-01

## 2024-01-22 PROBLEM — I83.90 VARICOSE VEINS OF LOWER EXTREMITY: Status: ACTIVE | Noted: 2024-01-22

## 2024-01-22 PROBLEM — R11.2 NAUSEA AND VOMITING: Status: ACTIVE | Noted: 2024-01-22

## 2024-01-22 PROBLEM — E87.6 HYPOKALEMIA: Status: ACTIVE | Noted: 2024-01-22

## 2024-01-22 PROBLEM — M79.18 MYALGIA, OTHER SITE: Status: ACTIVE | Noted: 2023-12-12

## 2024-04-25 PROBLEM — Z71.3 DIETARY COUNSELING AND SURVEILLANCE: Status: ACTIVE | Noted: 2023-08-16

## 2024-04-25 PROBLEM — S42.202A CLOSED FRACTURE OF PROXIMAL END OF LEFT HUMERUS: Status: ACTIVE | Noted: 2024-04-03

## 2024-07-25 PROBLEM — S42.302A COMMINUTED FRACTURE OF LEFT HUMERUS: Status: ACTIVE | Noted: 2019-02-06

## 2024-07-25 PROBLEM — Z93.1 GASTROSTOMY TUBE IN PLACE (HCC): Status: ACTIVE | Noted: 2024-07-25

## 2024-07-25 PROBLEM — N13.30 HYDRONEPHROSIS: Status: ACTIVE | Noted: 2024-07-18

## 2024-07-25 PROBLEM — Z98.890 OTHER SPECIFIED POSTPROCEDURAL STATES: Status: ACTIVE | Noted: 2023-08-16

## 2024-07-25 PROBLEM — R62.7 FAILURE TO THRIVE IN ADULT: Status: ACTIVE | Noted: 2024-07-25

## 2024-07-30 PROBLEM — N39.0 COMPLICATED UTI (URINARY TRACT INFECTION): Status: ACTIVE | Noted: 2024-07-30

## 2024-08-12 ENCOUNTER — CARE COORDINATION (OUTPATIENT)
Dept: OTHER | Facility: CLINIC | Age: 74
End: 2024-08-12

## 2024-08-12 NOTE — CARE COORDINATION
Ambulatory Care Coordination Note     8/12/2024 12:32 PM     Patient outreach attempt by this ACM today to offer care management services. ACM was unable to reach the patient by telephone today; left voice message requesting a return phone call to this ACM.     ACM: Ashanti Jeffrey RN       PCP/Specialist follow up:   Future Appointments         Provider Specialty Dept Phone    10/31/2024 11:00 AM Russell Whitney MD Urology [phone]            Follow Up:   Plan for next AC outreach in approximately  2-5 business days  to complete:  - outreach attempt to offer care management services.     Ashanti Jeffrey RN   Ambulatory Care Manager  Cell [phone]  [email or web address]

## 2024-08-15 ENCOUNTER — CARE COORDINATION (OUTPATIENT)
Dept: OTHER | Facility: CLINIC | Age: 74
End: 2024-08-15

## 2024-08-15 NOTE — CARE COORDINATION
Ambulatory Care Coordination Note     8/15/2024 11:35 AM     Patient Current Location:  [place]     This patient was received as a referral from [employer] [facility] .    ACM contacted the patient by telephone. Verified name and  with patient as identifiers. Provided introduction to self, and explanation of the ACM role.   Patient declined care management services at this time.          ACM: Ashanti Jeffrey RN     Challenges to be reviewed by the provider   Additional needs identified to be addressed with provider No  none               Method of communication with provider: none.    Care Summary Note: ACM outreached patient to offer HRCM enrollment related to recent hospital admission for a UTI. ACM gave explanation of role. Patient states she already has everything she needs at home. She states she lives with her  and they help to take care of eachother. She receives PT/OT and RN visits 2x per week. She states the RN visits to \"complete wound care and take care of everything else\". She states she doesn't need financial assistance with her medications. She doesn't have any concerns to bring up to her providers, she has no questions for ACM. She appreciates the phone call and will keep [facility]'s contact info but declines further follow up calls. ACM will sign off.     Offered patient enrollment in the Remote Patient Monitoring (RPM) program for in-home monitoring: Patient is not eligible for RPM program because: affiliate provider.     Assessments Completed:   Ambulatory Care Coordination Assessment    Care Coordination Protocol  Referral from Primary Care Provider: No  Week 1 - Initial Assessment     Do you have all of your prescriptions and are they filled?: Yes  Barriers to medication adherence: None  Are you able to afford your medications?: Yes  How often do you have trouble taking your medications the way you have been told to take them?: I always take them as prescribed.     Do you have Home O2
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
## 2024-10-03 PROBLEM — S72.141A CLOSED INTERTROCHANTERIC FRACTURE, RIGHT, INITIAL ENCOUNTER (HCC): Status: ACTIVE | Noted: 2024-10-03

## 2024-10-03 PROBLEM — S72.141A CLOSED FRACTURE OF INTERTROCHANTERIC SECTION OF FEMUR, RIGHT, INITIAL ENCOUNTER (HCC): Status: ACTIVE | Noted: 2024-10-03

## 2024-10-08 ENCOUNTER — CARE COORDINATION (OUTPATIENT)
Dept: CARE COORDINATION | Age: 74
End: 2024-10-08

## 2024-10-08 NOTE — CARE COORDINATION
SECURE email notification sent to identified IDT members at   [facility] and [facility]